# Patient Record
Sex: MALE | Race: OTHER | Employment: FULL TIME | URBAN - METROPOLITAN AREA
[De-identification: names, ages, dates, MRNs, and addresses within clinical notes are randomized per-mention and may not be internally consistent; named-entity substitution may affect disease eponyms.]

---

## 2018-09-06 ENCOUNTER — APPOINTMENT (OUTPATIENT)
Dept: GENERAL RADIOLOGY | Age: 48
End: 2018-09-06
Attending: EMERGENCY MEDICINE
Payer: COMMERCIAL

## 2018-09-06 ENCOUNTER — HOSPITAL ENCOUNTER (OUTPATIENT)
Age: 48
Discharge: HOME | End: 2018-09-06
Attending: EMERGENCY MEDICINE
Payer: COMMERCIAL

## 2018-09-06 VITALS
HEIGHT: 71 IN | DIASTOLIC BLOOD PRESSURE: 68 MMHG | TEMPERATURE: 97 F | OXYGEN SATURATION: 98 % | RESPIRATION RATE: 16 BRPM | WEIGHT: 208 LBS | SYSTOLIC BLOOD PRESSURE: 124 MMHG | HEART RATE: 58 BPM | BODY MASS INDEX: 29.12 KG/M2

## 2018-09-06 DIAGNOSIS — J30.2 SEASONAL ALLERGIES: Primary | ICD-10-CM

## 2018-09-06 DIAGNOSIS — R05.9 COUGH: ICD-10-CM

## 2018-09-06 PROCEDURE — 71046 X-RAY EXAM CHEST 2 VIEWS: CPT | Performed by: EMERGENCY MEDICINE

## 2018-09-06 PROCEDURE — 99204 OFFICE O/P NEW MOD 45 MIN: CPT

## 2018-09-06 PROCEDURE — 93010 ELECTROCARDIOGRAM REPORT: CPT

## 2018-09-06 PROCEDURE — 93005 ELECTROCARDIOGRAM TRACING: CPT

## 2018-09-06 RX ORDER — METHYLPREDNISOLONE 4 MG/1
TABLET ORAL
Qty: 1 PACKAGE | Refills: 0 | Status: SHIPPED | OUTPATIENT
Start: 2018-09-06 | End: 2019-02-27

## 2018-09-07 NOTE — ED PROVIDER NOTES
Patient presents with:  Cough/URI  Ear Problem    HPI:     Jose Cruz Paulson is a 50year old male who presents with chief complaint of cough, ear pressure, chest pain with cough. Started 2 days ago with congestion, thought to be due to allergy.   Took allegra None.  TECHNIQUE:  PA and lateral chest radiographs were obtained. PATIENT STATED HISTORY: (As transcribed by Technologist)  Patient states midsternal chest pain when sneezing from allergies for two days. States no cough or shortness of breath.     FINDING

## 2018-09-07 NOTE — ED INITIAL ASSESSMENT (HPI)
Started on Tuesday with allergy type symptoms of sneezing, runny nose. Then developed into cough, non productive, and hurts when coughing. Also feels like ears are clogged, no pain. Denies fevers.

## 2018-09-09 LAB
ATRIAL RATE: 51 BPM
P AXIS: 45 DEGREES
P-R INTERVAL: 168 MS
Q-T INTERVAL: 412 MS
QRS DURATION: 100 MS
QTC CALCULATION (BEZET): 379 MS
R AXIS: 55 DEGREES
T AXIS: 60 DEGREES
VENTRICULAR RATE: 51 BPM

## 2018-10-05 ENCOUNTER — CHARTING TRANS (OUTPATIENT)
Dept: OTHER | Age: 48
End: 2018-10-05

## 2018-10-11 ENCOUNTER — CHARTING TRANS (OUTPATIENT)
Dept: OTHER | Age: 48
End: 2018-10-11

## 2018-10-11 ASSESSMENT — PAIN SCALES - GENERAL: PAINLEVEL_OUTOF10: 0

## 2018-12-08 VITALS
OXYGEN SATURATION: 97 % | HEIGHT: 71 IN | TEMPERATURE: 98.6 F | RESPIRATION RATE: 16 BRPM | WEIGHT: 209.77 LBS | HEART RATE: 57 BPM | BODY MASS INDEX: 29.37 KG/M2

## 2019-02-27 ENCOUNTER — OFFICE VISIT (OUTPATIENT)
Dept: FAMILY MEDICINE CLINIC | Facility: CLINIC | Age: 49
End: 2019-02-27
Payer: COMMERCIAL

## 2019-02-27 VITALS
TEMPERATURE: 98 F | RESPIRATION RATE: 14 BRPM | SYSTOLIC BLOOD PRESSURE: 122 MMHG | DIASTOLIC BLOOD PRESSURE: 82 MMHG | HEIGHT: 70.5 IN | WEIGHT: 209.63 LBS | HEART RATE: 56 BPM | BODY MASS INDEX: 29.68 KG/M2

## 2019-02-27 DIAGNOSIS — Z00.00 PHYSICAL EXAM: Primary | ICD-10-CM

## 2019-02-27 DIAGNOSIS — Z12.11 ENCOUNTER FOR SCREENING COLONOSCOPY: ICD-10-CM

## 2019-02-27 DIAGNOSIS — Z13.220 LIPID SCREENING: ICD-10-CM

## 2019-02-27 PROCEDURE — 99386 PREV VISIT NEW AGE 40-64: CPT | Performed by: FAMILY MEDICINE

## 2019-02-27 NOTE — PROGRESS NOTES
HPI:    Patient ID: Javier Almodovar is a 46year old male. Patient presents with:  Physical: due for colonoscopy      HPI   Patient is here to establish care and for physical.  No significant past medical or surgical history.   States he had elevated blood canal normal.   Left Ear: Tympanic membrane, external ear and ear canal normal.   Nose: Nose normal.   Mouth/Throat: No oropharyngeal exudate, posterior oropharyngeal edema or posterior oropharyngeal erythema. Eyes: Right eye exhibits no discharge.  Left

## 2019-03-05 VITALS
RESPIRATION RATE: 16 BRPM | TEMPERATURE: 97.7 F | HEART RATE: 62 BPM | OXYGEN SATURATION: 98 % | DIASTOLIC BLOOD PRESSURE: 78 MMHG | SYSTOLIC BLOOD PRESSURE: 139 MMHG

## 2019-04-01 ENCOUNTER — PATIENT OUTREACH (OUTPATIENT)
Dept: FAMILY MEDICINE CLINIC | Facility: CLINIC | Age: 49
End: 2019-04-01

## 2019-05-10 PROCEDURE — 88305 TISSUE EXAM BY PATHOLOGIST: CPT | Performed by: INTERNAL MEDICINE

## 2019-11-12 ENCOUNTER — OFFICE VISIT (OUTPATIENT)
Dept: FAMILY MEDICINE CLINIC | Facility: CLINIC | Age: 49
End: 2019-11-12
Payer: COMMERCIAL

## 2019-11-12 VITALS
SYSTOLIC BLOOD PRESSURE: 118 MMHG | BODY MASS INDEX: 29.3 KG/M2 | TEMPERATURE: 98 F | HEIGHT: 70.5 IN | RESPIRATION RATE: 16 BRPM | DIASTOLIC BLOOD PRESSURE: 80 MMHG | WEIGHT: 207 LBS | HEART RATE: 60 BPM

## 2019-11-12 DIAGNOSIS — R51.9 NEW ONSET OF HEADACHES AFTER AGE 50: ICD-10-CM

## 2019-11-12 DIAGNOSIS — R73.9 HYPERGLYCEMIA: ICD-10-CM

## 2019-11-12 DIAGNOSIS — R51.9 NEW ONSET HEADACHE: Primary | ICD-10-CM

## 2019-11-12 DIAGNOSIS — Z00.00 PHYSICAL EXAM: ICD-10-CM

## 2019-11-12 PROCEDURE — 80061 LIPID PANEL: CPT | Performed by: FAMILY MEDICINE

## 2019-11-12 PROCEDURE — 83036 HEMOGLOBIN GLYCOSYLATED A1C: CPT | Performed by: FAMILY MEDICINE

## 2019-11-12 PROCEDURE — 36415 COLL VENOUS BLD VENIPUNCTURE: CPT | Performed by: FAMILY MEDICINE

## 2019-11-12 PROCEDURE — 80050 GENERAL HEALTH PANEL: CPT | Performed by: FAMILY MEDICINE

## 2019-11-12 PROCEDURE — 82607 VITAMIN B-12: CPT | Performed by: FAMILY MEDICINE

## 2019-11-12 PROCEDURE — 99214 OFFICE O/P EST MOD 30 MIN: CPT | Performed by: FAMILY MEDICINE

## 2019-11-12 NOTE — PROGRESS NOTES
La Morgan is a 46year old male. Patient presents with:  Headache: new problem, feels pressure in head,       HPI:   Had an interview on 10/21/19, started getting a bad headache when they were asking his questions.  Felt pressure in the temples and got lesions or rashes  RESPIRATORY: denies shortness of breath with exertion  CARDIOVASCULAR: denies chest pain on exertion  GI: denies abdominal pain and denies heartburn  NEURO: denies headaches    EXAM:   /80   Pulse 60   Temp 97.9 °F (36.6 °C) (Tempo Platelet          Standing Status: Future          Number of Occurrences: 1          Standing Expiration Date: 11/12/2020      Comp Metabolic Panel (14)          Standing Status: Future          Number of Occurrences: 1          Standing Expiration Date: 1

## 2019-11-13 ENCOUNTER — TELEPHONE (OUTPATIENT)
Dept: FAMILY MEDICINE CLINIC | Facility: CLINIC | Age: 49
End: 2019-11-13

## 2019-11-13 DIAGNOSIS — R73.9 HYPERGLYCEMIA: Primary | ICD-10-CM

## 2019-11-13 NOTE — TELEPHONE ENCOUNTER
----- Message from Ameya Loomis DO sent at 11/13/2019  2:42 PM CST -----  Pls let pt know: [facility], your labs are back. Blood cell counts, thyroid and vit b12 are normal, but you have a low sodium and a high sugar level and your cholesterol is high.  That a

## 2019-11-15 NOTE — TELEPHONE ENCOUNTER
Per [facility], MRI has been authorized.  Valid until 12/14/19    Patient notified via detailed voicemail left at cell number (ok per  HIPAA consent)

## 2019-11-25 ENCOUNTER — OFFICE VISIT (OUTPATIENT)
Dept: FAMILY MEDICINE CLINIC | Facility: CLINIC | Age: 49
End: 2019-11-25
Payer: COMMERCIAL

## 2019-11-25 VITALS
SYSTOLIC BLOOD PRESSURE: 100 MMHG | HEIGHT: 70.5 IN | TEMPERATURE: 98 F | HEART RATE: 76 BPM | BODY MASS INDEX: 28.74 KG/M2 | WEIGHT: 203 LBS | DIASTOLIC BLOOD PRESSURE: 66 MMHG | RESPIRATION RATE: 16 BRPM

## 2019-11-25 DIAGNOSIS — E78.00 PURE HYPERCHOLESTEROLEMIA: ICD-10-CM

## 2019-11-25 DIAGNOSIS — E11.65 TYPE 2 DIABETES MELLITUS WITH HYPERGLYCEMIA, WITHOUT LONG-TERM CURRENT USE OF INSULIN (HCC): Primary | ICD-10-CM

## 2019-11-25 DIAGNOSIS — Z23 NEED FOR VACCINATION: ICD-10-CM

## 2019-11-25 PROCEDURE — 82570 ASSAY OF URINE CREATININE: CPT | Performed by: FAMILY MEDICINE

## 2019-11-25 PROCEDURE — 82043 UR ALBUMIN QUANTITATIVE: CPT | Performed by: FAMILY MEDICINE

## 2019-11-25 PROCEDURE — 90471 IMMUNIZATION ADMIN: CPT | Performed by: FAMILY MEDICINE

## 2019-11-25 PROCEDURE — 90686 IIV4 VACC NO PRSV 0.5 ML IM: CPT | Performed by: FAMILY MEDICINE

## 2019-11-25 PROCEDURE — 99214 OFFICE O/P EST MOD 30 MIN: CPT | Performed by: FAMILY MEDICINE

## 2019-11-25 RX ORDER — METFORMIN HYDROCHLORIDE 500 MG/1
500 TABLET, EXTENDED RELEASE ORAL 2 TIMES DAILY WITH MEALS
Qty: 180 TABLET | Refills: 0 | Status: SHIPPED | OUTPATIENT
Start: 2019-11-25 | End: 2020-02-22

## 2019-11-25 RX ORDER — ATORVASTATIN CALCIUM 10 MG/1
10 TABLET, FILM COATED ORAL NIGHTLY
Qty: 90 TABLET | Refills: 0 | Status: SHIPPED | OUTPATIENT
Start: 2019-11-25 | End: 2020-02-10

## 2019-11-25 NOTE — PROGRESS NOTES
Magdalena Ley is a 46year old male. Patient presents with:  Diabetes: discuss new diabetes      HPI:   New diabetic. Found on labs after onset of new headaches. Headaches have since resolved. Not on meds.  He does the cooking at home and makes carb-heav Temp 97.9 °F (36.6 °C) (Temporal)   Resp 16   Ht 70.5\"   Wt 203 lb (92.1 kg)   BMI 28.72 kg/m²  Body mass index is 28.72 kg/m².       GENERAL: well developed, well nourished,in no apparent distress  SKIN: no rashes,no suspicious lesions  HEENT: atraumatic, this Visit:  Requested Prescriptions     Signed Prescriptions Disp Refills   • metFORMIN HCl  MG Oral Tablet 24 Hr 180 tablet 0     Sig: Take 1 tablet (500 mg total) by mouth 2 (two) times daily with meals.    • atorvastatin 10 MG Oral Tab 90 tablet 0

## 2019-12-02 ENCOUNTER — NURSE ONLY (OUTPATIENT)
Dept: ENDOCRINOLOGY CLINIC | Facility: CLINIC | Age: 49
End: 2019-12-02
Payer: COMMERCIAL

## 2019-12-02 DIAGNOSIS — E11.65 TYPE 2 DIABETES MELLITUS WITH HYPERGLYCEMIA, WITHOUT LONG-TERM CURRENT USE OF INSULIN (HCC): Primary | ICD-10-CM

## 2019-12-02 PROCEDURE — G0108 DIAB MANAGE TRN  PER INDIV: HCPCS | Performed by: DIETITIAN, REGISTERED

## 2019-12-02 RX ORDER — BLOOD SUGAR DIAGNOSTIC
STRIP MISCELLANEOUS
Qty: 200 EACH | Refills: 3 | Status: SHIPPED | OUTPATIENT
Start: 2019-12-02 | End: 2019-12-18

## 2019-12-08 VITALS — DIASTOLIC BLOOD PRESSURE: 66 MMHG | BODY MASS INDEX: 30 KG/M2 | WEIGHT: 210 LBS | SYSTOLIC BLOOD PRESSURE: 112 MMHG

## 2019-12-08 NOTE — PROGRESS NOTES
Taco Barahona  : 1970 attended Step 1 Diabetic Education:    Date: 2019  Referring Provider: Dr. Rocio Verma  Start time: 2pm End time: 3:00pm    /72 (BP Location: Right arm, Patient Position: Sitting, Cuff Size: adult)   Wt 210 lb (95.3 Start monitoring blood glucose twice daily at varied times  Limit carbs to 45-60 grams per meal; measure rice & beans & reduce no.  of tortillas  Start drinking more water reduce reg. coke intake   Follow-up in 2 wks      Prescriptions sent to [facility] p

## 2019-12-10 ENCOUNTER — NURSE ONLY (OUTPATIENT)
Dept: ENDOCRINOLOGY CLINIC | Facility: CLINIC | Age: 49
End: 2019-12-10
Payer: COMMERCIAL

## 2019-12-10 DIAGNOSIS — E11.9 DIABETES MELLITUS WITHOUT COMPLICATION (HCC): Primary | ICD-10-CM

## 2019-12-10 PROCEDURE — G0108 DIAB MANAGE TRN  PER INDIV: HCPCS | Performed by: DIETITIAN, REGISTERED

## 2019-12-15 VITALS — BODY MASS INDEX: 30 KG/M2 | SYSTOLIC BLOOD PRESSURE: 110 MMHG | DIASTOLIC BLOOD PRESSURE: 70 MMHG | WEIGHT: 209.63 LBS

## 2019-12-18 ENCOUNTER — TELEPHONE (OUTPATIENT)
Dept: ENDOCRINOLOGY CLINIC | Facility: CLINIC | Age: 49
End: 2019-12-18

## 2019-12-18 DIAGNOSIS — E11.65 TYPE 2 DIABETES MELLITUS WITH HYPERGLYCEMIA, WITHOUT LONG-TERM CURRENT USE OF INSULIN (HCC): ICD-10-CM

## 2019-12-18 RX ORDER — BLOOD SUGAR DIAGNOSTIC
1 STRIP MISCELLANEOUS 2 TIMES DAILY
Qty: 200 EACH | Refills: 3 | Status: SHIPPED | OUTPATIENT
Start: 2019-12-18 | End: 2019-12-19

## 2020-01-21 ENCOUNTER — NURSE ONLY (OUTPATIENT)
Dept: ENDOCRINOLOGY CLINIC | Facility: CLINIC | Age: 50
End: 2020-01-21
Payer: COMMERCIAL

## 2020-01-21 VITALS — BODY MASS INDEX: 30 KG/M2 | WEIGHT: 209.38 LBS | DIASTOLIC BLOOD PRESSURE: 66 MMHG | SYSTOLIC BLOOD PRESSURE: 98 MMHG

## 2020-01-21 DIAGNOSIS — E11.65 TYPE 2 DIABETES MELLITUS WITH HYPERGLYCEMIA, WITHOUT LONG-TERM CURRENT USE OF INSULIN (HCC): ICD-10-CM

## 2020-01-21 PROCEDURE — G0108 DIAB MANAGE TRN  PER INDIV: HCPCS | Performed by: DIETITIAN, REGISTERED

## 2020-01-21 NOTE — PROGRESS NOTES
Audrene Cockayne  : 1970 was seen for Diabetic Education Follow up:    Date: 2020  Referring Provider: Dr. Epi Archibald  Start time: 8:30am End time: 9:00am    Assessment:     Assessment: BP 98/66 (BP Location: Right arm, Patient Position: Sitting, 1 week    Hypoglycemia  - Causes/symptoms/prevention/treatment-Rule of 15  - Low Blood Glucose: <70 mg/dL  - Contact MD if >2BG <70 in 1 week    REDUCING RISKS:  - relationship between glucose control and risk for complications in eye, heart, kidneys,nerve

## 2020-02-10 DIAGNOSIS — E78.00 PURE HYPERCHOLESTEROLEMIA: ICD-10-CM

## 2020-02-10 RX ORDER — ATORVASTATIN CALCIUM 10 MG/1
10 TABLET, FILM COATED ORAL DAILY
Qty: 90 TABLET | Refills: 0 | Status: SHIPPED | OUTPATIENT
Start: 2020-02-10 | End: 2020-05-11

## 2020-02-10 NOTE — TELEPHONE ENCOUNTER
Last OV 11/25/19: Return in about 3 months (around 2/25/2020).      Last lipid/cmp 11/12/19, ALT 77  Last refilled 11/25/19  #90  0 refills    Future Appointments   Date Time Provider Laurel Buitrago   3/17/2020  8:30 AM Elise Appiah RN, CDE [facility]

## 2020-02-21 ENCOUNTER — OFFICE VISIT (OUTPATIENT)
Dept: FAMILY MEDICINE CLINIC | Facility: CLINIC | Age: 50
End: 2020-02-21
Payer: COMMERCIAL

## 2020-02-21 VITALS
SYSTOLIC BLOOD PRESSURE: 118 MMHG | RESPIRATION RATE: 16 BRPM | HEIGHT: 70.5 IN | TEMPERATURE: 97 F | HEART RATE: 68 BPM | WEIGHT: 211 LBS | BODY MASS INDEX: 29.87 KG/M2 | DIASTOLIC BLOOD PRESSURE: 70 MMHG

## 2020-02-21 DIAGNOSIS — E11.65 TYPE 2 DIABETES MELLITUS WITH HYPERGLYCEMIA, WITHOUT LONG-TERM CURRENT USE OF INSULIN (HCC): Primary | ICD-10-CM

## 2020-02-21 DIAGNOSIS — E78.00 PURE HYPERCHOLESTEROLEMIA: ICD-10-CM

## 2020-02-21 LAB
CHOLEST SMN-MCNC: 152 MG/DL (ref ?–200)
EST. AVERAGE GLUCOSE BLD GHB EST-MCNC: 197 MG/DL (ref 68–126)
HBA1C MFR BLD HPLC: 8.5 % (ref ?–5.7)
HDLC SERPL-MCNC: 53 MG/DL (ref 40–59)
LDLC SERPL CALC-MCNC: 50 MG/DL (ref ?–100)
NONHDLC SERPL-MCNC: 99 MG/DL (ref ?–130)
PATIENT FASTING Y/N/NP: NO
TRIGL SERPL-MCNC: 247 MG/DL (ref 30–149)
VLDLC SERPL CALC-MCNC: 49 MG/DL (ref 0–30)

## 2020-02-21 PROCEDURE — 83036 HEMOGLOBIN GLYCOSYLATED A1C: CPT | Performed by: FAMILY MEDICINE

## 2020-02-21 PROCEDURE — 80061 LIPID PANEL: CPT | Performed by: FAMILY MEDICINE

## 2020-02-21 PROCEDURE — 99214 OFFICE O/P EST MOD 30 MIN: CPT | Performed by: FAMILY MEDICINE

## 2020-02-21 PROCEDURE — 36415 COLL VENOUS BLD VENIPUNCTURE: CPT | Performed by: FAMILY MEDICINE

## 2020-02-21 NOTE — PROGRESS NOTES
HPI:   Princess Choi is a 44year old male who presents for recheck of his diabetes. Patient’s FBS have been 140-150, average in 177 for 3 months. Last visit with ophthalmologist was earlier this year at [facility].   Pt has been checking his feet on a regular daily with meals.  180 tablet 0      Past Medical History:   Diagnosis Date   • Allergies       Past Surgical History:   Procedure Laterality Date   • COLONOSCOPY  05/2019    normal- repeat 10 yrs      Social History: Social History    Tobacco Use      Smok return in 3 months or sooner if needed.      Type 2 diabetes mellitus with hyperglycemia, without long-term current use of insulin (hcc)  (primary encounter diagnosis)  Pure hypercholesterolemia    Orders Placed This Encounter      Lipid Panel      Hemoglob

## 2020-02-22 RX ORDER — METFORMIN HYDROCHLORIDE 500 MG/1
1000 TABLET, EXTENDED RELEASE ORAL 2 TIMES DAILY WITH MEALS
Qty: 360 TABLET | Refills: 0 | Status: SHIPPED | OUTPATIENT
Start: 2020-02-22 | End: 2020-05-29

## 2020-02-25 DIAGNOSIS — E11.65 TYPE 2 DIABETES MELLITUS WITH HYPERGLYCEMIA, WITHOUT LONG-TERM CURRENT USE OF INSULIN (HCC): Primary | ICD-10-CM

## 2020-05-10 DIAGNOSIS — E78.00 PURE HYPERCHOLESTEROLEMIA: ICD-10-CM

## 2020-05-11 RX ORDER — ATORVASTATIN CALCIUM 10 MG/1
TABLET, FILM COATED ORAL
Qty: 90 TABLET | Refills: 1 | Status: SHIPPED | OUTPATIENT
Start: 2020-05-11 | End: 2020-11-09

## 2020-05-11 NOTE — TELEPHONE ENCOUNTER
Cholesterol Medication Protocol Passed5/10 9:07 PM   ALT < 80    ALT resulted within past year    Lipid panel within past 12 months    Appointment within past 12 or next 3 months     Last refill - 2/10/20 - #90   Last ALT - 11/12/19 - 77  Last lipid panel

## 2020-05-28 ENCOUNTER — E-VISIT (OUTPATIENT)
Dept: FAMILY MEDICINE CLINIC | Facility: CLINIC | Age: 50
End: 2020-05-28

## 2020-05-28 DIAGNOSIS — Z20.822 SUSPECTED COVID-19 VIRUS INFECTION: Primary | ICD-10-CM

## 2020-05-28 PROCEDURE — 99421 OL DIG E/M SVC 5-10 MIN: CPT | Performed by: NURSE PRACTITIONER

## 2020-05-28 NOTE — PROGRESS NOTES
La Morgan is a 44year old male. HPI:   See answers to questions above.      Current Outpatient Medications   Medication Sig Dispense Refill   • ATORVASTATIN 10 MG Oral Tab TAKE 1 TABLET BY MOUTH DAILY 90 tablet 1   • CONTOUR NEXT TEST In Vitro Strip 1

## 2020-05-29 DIAGNOSIS — E11.65 TYPE 2 DIABETES MELLITUS WITH HYPERGLYCEMIA, WITHOUT LONG-TERM CURRENT USE OF INSULIN (HCC): ICD-10-CM

## 2020-05-29 RX ORDER — METFORMIN HYDROCHLORIDE 500 MG/1
TABLET, EXTENDED RELEASE ORAL
Qty: 360 TABLET | Refills: 0 | Status: SHIPPED | OUTPATIENT
Start: 2020-05-29 | End: 2020-09-08

## 2020-05-29 NOTE — TELEPHONE ENCOUNTER
Future Appointments   Date Time Provider Laurel Buitrago   6/1/2020  4:00 PM Divya Paulino DO [facility] MONIQUE Sandoval

## 2020-05-29 NOTE — TELEPHONE ENCOUNTER
Last OV 2/21/2020  Last lab 2/21/2020 A1c 8.5,  11/25/19 microalbumin  Last refilled 2/22/2020  #360  0 refills      Diabetic Medication Protocol Failed5/29 1:50 PM   Last HgBA1C < 7.5    HgBA1C procedure resulted in past 6 months    Microalbumin procedure

## 2020-05-29 NOTE — TELEPHONE ENCOUNTER
Patient notified via detailed voicemail left at cell number (ok per  HIPAA consent)    Asked to call back and schedule.  Can refill until appt time

## 2020-06-01 ENCOUNTER — LAB ENCOUNTER (OUTPATIENT)
Dept: LAB | Facility: HOSPITAL | Age: 50
End: 2020-06-01
Attending: NURSE PRACTITIONER
Payer: COMMERCIAL

## 2020-06-01 DIAGNOSIS — Z20.822 SUSPECTED COVID-19 VIRUS INFECTION: ICD-10-CM

## 2020-06-02 ENCOUNTER — TELEPHONE (OUTPATIENT)
Dept: INTERNAL MEDICINE CLINIC | Facility: CLINIC | Age: 50
End: 2020-06-02

## 2020-06-02 ENCOUNTER — PATIENT OUTREACH (OUTPATIENT)
Dept: CASE MANAGEMENT | Age: 50
End: 2020-06-02

## 2020-06-02 NOTE — PROGRESS NOTES
Home Monitoring Condition Update    Covid19+ test date: 6/1/2020      Consent Verification:  Assessment Completed With: Patient  HIPAA Verified?   Yes    COVID-19 HOME MONITORING 6/2/2020   How are you feeling \"feeling good\"   Short of breath No   Cough

## 2020-06-04 ENCOUNTER — VIRTUAL PHONE E/M (OUTPATIENT)
Dept: FAMILY MEDICINE CLINIC | Facility: CLINIC | Age: 50
End: 2020-06-04
Payer: COMMERCIAL

## 2020-06-04 DIAGNOSIS — U07.1 COVID-19 VIRUS INFECTION: Primary | ICD-10-CM

## 2020-06-04 DIAGNOSIS — E11.65 TYPE 2 DIABETES MELLITUS WITH HYPERGLYCEMIA, WITHOUT LONG-TERM CURRENT USE OF INSULIN (HCC): ICD-10-CM

## 2020-06-04 PROCEDURE — 99214 OFFICE O/P EST MOD 30 MIN: CPT | Performed by: FAMILY MEDICINE

## 2020-06-04 NOTE — PROGRESS NOTES
Abraham Muro,   P [facility] Home Monitoring             He is short of breath with exertion, not starting to get better yet and uncontrolled diabetic. Please follow up in 1-2 days for monitoring. Thank you!

## 2020-06-04 NOTE — PROGRESS NOTES
Virtual Telephone Check-In    Bulmaro Soto verbally consents to a Virtual/Telephone Check-In visit on 06/04/20. Patient has been referred to the [facility] website at [email or web address] to review the yearly Consent to Treat document.     Patient Hetal Vásquez this visit:    COVID-19 virus infection    Type 2 diabetes mellitus with hyperglycemia, without long-term current use of insulin ([facility]      Doing okay, not getting worse at this point.    He is diabetic, so he does need to be monitored closely, he does have

## 2020-06-05 ENCOUNTER — PATIENT OUTREACH (OUTPATIENT)
Dept: CASE MANAGEMENT | Age: 50
End: 2020-06-05

## 2020-06-05 ENCOUNTER — TELEPHONE (OUTPATIENT)
Dept: FAMILY MEDICINE CLINIC | Facility: CLINIC | Age: 50
End: 2020-06-05

## 2020-06-05 NOTE — PROGRESS NOTES
LMOMTCB for home monitoring day #3 condition update. NCM contact information provided.       See PCP notes from 6/4/2020 VV      Diagnoses and all orders for this visit:     COVID-19 virus infection     Type 2 diabetes mellitus with hyperglycemia, without

## 2020-06-05 NOTE — TELEPHONE ENCOUNTER
Spoke to patient for day 3 home monitoring update. Patient denies SOB today, still has intermittent cough which he reports is the same. Patient mentioned new rash that developed yesterday on R inner side of ankle and R inner side of forearm.  Reports the r

## 2020-06-05 NOTE — PROGRESS NOTES
Home Monitoring Condition Update    Covid19+ test date: 6/1/20      Consent Verification:  Assessment Completed With: Patient  HIPAA Verified?   Yes    COVID-19 HOME MONITORING 6/5/2020   Temperature 97.6   Reading From Forehead   Pulse 84   Pulse taken f patient was also directed to continue to isolate away from other household members when possible and stay completely isolated from the general public 3 days after symptoms resolve or 10 days total (whichever is longer).   Advised patient If symptoms worsen/

## 2020-06-06 ENCOUNTER — PATIENT OUTREACH (OUTPATIENT)
Dept: CASE MANAGEMENT | Age: 50
End: 2020-06-06

## 2020-06-08 ENCOUNTER — TELEPHONE (OUTPATIENT)
Dept: FAMILY MEDICINE CLINIC | Facility: CLINIC | Age: 50
End: 2020-06-08

## 2020-06-08 ENCOUNTER — PATIENT OUTREACH (OUTPATIENT)
Dept: CASE MANAGEMENT | Age: 50
End: 2020-06-08

## 2020-06-08 NOTE — TELEPHONE ENCOUNTER
Have him follow up with me for phone visit Thursday this week and I will let you know if he needs further monitoring. Thanks.

## 2020-06-08 NOTE — TELEPHONE ENCOUNTER
Call to Dr Kulwant Del Angel ofc-spoke w Reyesyasmindeanna Vance re condition update sent to  this morning. Seeking Dr murrell. Cherelle Henning Dr is w patients. She will route messg to clinical staff again now to obtain input from Dr Toma Wright.  Provided our ofc # and phone

## 2020-06-08 NOTE — TELEPHONE ENCOUNTER
Spoke with patient for day #4 Covid Home Monitoring (test date 6/01/2020)--symptoms, overall, are improving, but rash is slightly worse, despite following recommendations.     Temp: 97.8 (temporal)  Pulse: 64 (manual)    \"I'm feeling better now-my cough is

## 2020-06-08 NOTE — PROGRESS NOTES
Home Monitoring Condition Update    Covid19+ test date: 6/01/2002      Consent Verification:  Assessment Completed With: Patient  HIPAA Verified?   Yes    COVID-19 HOME MONITORING 6/8/2020   Temperature 97.8   Reading From Forehead   Pulse 64   Pulse take diet with small, frequent meals. Advised to take Tylenol q 6 hrs prn fever/pain, not to exceed 3000 mg/day--to call back with worsening symptoms--patient verbalizes understanding and agreement.     Patient advised to inform their Employee Health department

## 2020-06-08 NOTE — PROGRESS NOTES
LMOMTCB for home monitoring day #4 condition update. NCM contact information provided.      F/U rash to right ankle and right forearm (OTC antihistamines?)                 Diagnoses and all orders for this visit:     COVID-19 virus infection     Type 2 tye

## 2020-06-08 NOTE — TELEPHONE ENCOUNTER
Call to pt-advised of info noted below stating PCP will get back to him w recommendations re rash.    Also advised PCP recommends VV 6/11/20-telephone visit scheduled for 6/11/2020 900 am.   Patient voices understanding/agrees with plan/no further questions

## 2020-06-09 ENCOUNTER — PATIENT OUTREACH (OUTPATIENT)
Dept: CASE MANAGEMENT | Age: 50
End: 2020-06-09

## 2020-06-09 NOTE — PROGRESS NOTES
Home Monitoring Condition Update    Covid19+ test date: 6/01/2020      Consent Verification:  Assessment Completed With: Patient  HIPAA Verified?   Yes    COVID-19 HOME MONITORING 6/9/2020   Temperature 97.4   Reading From Forehead   Pulse 60   Pulse take Advised to take Tylenol q 6 hrs prn fever/pain, not to exceed 3000 mg/day--to call back with worsening symptoms--patient verbalizes understanding and agreement.       Patient advised to inform their Employee Health department or Manager when they have teste

## 2020-06-10 ENCOUNTER — PATIENT OUTREACH (OUTPATIENT)
Dept: CASE MANAGEMENT | Age: 50
End: 2020-06-10

## 2020-06-10 NOTE — PROGRESS NOTES
Home Monitoring Condition Update    Covid19+ test date: 6/01/2020      Consent Verification:  Assessment Completed With: Patient  HIPAA Verified?   Yes    COVID-19 HOME MONITORING 6/10/2020   Temperature 97.8   Reading From Forehead   Pulse 56   Pulse tanya back with worsening symptoms--patient verbalizes understanding and agreement. Patient advised to inform their Employee Health department or Manager when they have tested positive for COVID-19.       The patient was also directed to continue to isolate aw

## 2020-06-11 ENCOUNTER — VIRTUAL PHONE E/M (OUTPATIENT)
Dept: FAMILY MEDICINE CLINIC | Facility: CLINIC | Age: 50
End: 2020-06-11
Payer: COMMERCIAL

## 2020-06-11 ENCOUNTER — PATIENT OUTREACH (OUTPATIENT)
Dept: CASE MANAGEMENT | Age: 50
End: 2020-06-11

## 2020-06-11 DIAGNOSIS — L30.9 DERMATITIS: ICD-10-CM

## 2020-06-11 DIAGNOSIS — U07.1 COVID-19 VIRUS INFECTION: Primary | ICD-10-CM

## 2020-06-11 DIAGNOSIS — E11.65 TYPE 2 DIABETES MELLITUS WITH HYPERGLYCEMIA, WITHOUT LONG-TERM CURRENT USE OF INSULIN (HCC): ICD-10-CM

## 2020-06-11 PROCEDURE — 99214 OFFICE O/P EST MOD 30 MIN: CPT | Performed by: FAMILY MEDICINE

## 2020-06-11 NOTE — PROGRESS NOTES
Virtual Telephone Check-In    Sourav Glsas verbally consents to a Virtual/Telephone Check-In visit on 06/11/20. Patient has been referred to the [facility] website at [email or web address] to review the yearly Consent to Treat document.     Patient Lelia Backbone MOUTH TWICE DAILY WITH MEALS, Disp: 360 tablet, Rfl: 0  ATORVASTATIN 10 MG Oral Tab, TAKE 1 TABLET BY MOUTH DAILY, Disp: 90 tablet, Rfl: 1  CONTOUR NEXT TEST In Vitro Strip, 1 each by Other route 4 (four) times daily before meals and nightly.  Test 4-6 time

## 2020-06-11 NOTE — PROGRESS NOTES
Madelin Osorio,   P [facility] Home Monitoring             Can discharge from home monitoring program. Thanks!

## 2020-09-07 DIAGNOSIS — E11.65 TYPE 2 DIABETES MELLITUS WITH HYPERGLYCEMIA, WITHOUT LONG-TERM CURRENT USE OF INSULIN (HCC): ICD-10-CM

## 2020-09-08 ENCOUNTER — LABORATORY ENCOUNTER (OUTPATIENT)
Dept: LAB | Age: 50
End: 2020-09-08
Attending: FAMILY MEDICINE
Payer: COMMERCIAL

## 2020-09-08 ENCOUNTER — OFFICE VISIT (OUTPATIENT)
Dept: FAMILY MEDICINE CLINIC | Facility: CLINIC | Age: 50
End: 2020-09-08
Payer: COMMERCIAL

## 2020-09-08 VITALS
SYSTOLIC BLOOD PRESSURE: 120 MMHG | WEIGHT: 209 LBS | BODY MASS INDEX: 29.59 KG/M2 | RESPIRATION RATE: 16 BRPM | HEART RATE: 64 BPM | HEIGHT: 70.5 IN | DIASTOLIC BLOOD PRESSURE: 80 MMHG | TEMPERATURE: 98 F

## 2020-09-08 DIAGNOSIS — R74.8 ELEVATED LIVER ENZYMES: ICD-10-CM

## 2020-09-08 DIAGNOSIS — E11.65 TYPE 2 DIABETES MELLITUS WITH HYPERGLYCEMIA, WITHOUT LONG-TERM CURRENT USE OF INSULIN (HCC): ICD-10-CM

## 2020-09-08 DIAGNOSIS — Z86.16 HISTORY OF 2019 NOVEL CORONAVIRUS DISEASE (COVID-19): ICD-10-CM

## 2020-09-08 DIAGNOSIS — E11.65 TYPE 2 DIABETES MELLITUS WITH HYPERGLYCEMIA, WITHOUT LONG-TERM CURRENT USE OF INSULIN (HCC): Primary | ICD-10-CM

## 2020-09-08 DIAGNOSIS — U07.1 COVID-19 VIRUS INFECTION: ICD-10-CM

## 2020-09-08 LAB
ALBUMIN SERPL-MCNC: 4.4 G/DL (ref 3.4–5)
ALBUMIN/GLOB SERPL: 1.1 {RATIO} (ref 1–2)
ALP LIVER SERPL-CCNC: 103 U/L (ref 45–117)
ALT SERPL-CCNC: 121 U/L (ref 16–61)
ANION GAP SERPL CALC-SCNC: 5 MMOL/L (ref 0–18)
AST SERPL-CCNC: 49 U/L (ref 15–37)
BILIRUB SERPL-MCNC: 0.8 MG/DL (ref 0.1–2)
BUN BLD-MCNC: 14 MG/DL (ref 7–18)
BUN/CREAT SERPL: 15.2 (ref 10–20)
CALCIUM BLD-MCNC: 9.9 MG/DL (ref 8.5–10.1)
CHLORIDE SERPL-SCNC: 103 MMOL/L (ref 98–112)
CHOLEST SMN-MCNC: 202 MG/DL (ref ?–200)
CO2 SERPL-SCNC: 28 MMOL/L (ref 21–32)
CREAT BLD-MCNC: 0.92 MG/DL (ref 0.7–1.3)
EST. AVERAGE GLUCOSE BLD GHB EST-MCNC: 186 MG/DL (ref 68–126)
GLOBULIN PLAS-MCNC: 3.9 G/DL (ref 2.8–4.4)
GLUCOSE BLD-MCNC: 155 MG/DL (ref 70–99)
HBA1C MFR BLD HPLC: 8.1 % (ref ?–5.7)
HDLC SERPL-MCNC: 56 MG/DL (ref 40–59)
LDLC SERPL CALC-MCNC: 118 MG/DL (ref ?–100)
M PROTEIN MFR SERPL ELPH: 8.3 G/DL (ref 6.4–8.2)
NONHDLC SERPL-MCNC: 146 MG/DL (ref ?–130)
OSMOLALITY SERPL CALC.SUM OF ELEC: 286 MOSM/KG (ref 275–295)
PATIENT FASTING Y/N/NP: NO
PATIENT FASTING Y/N/NP: NO
POTASSIUM SERPL-SCNC: 4.5 MMOL/L (ref 3.5–5.1)
SARS-COV-2 IGG SERPLBLD QL IA.RAPID: POSITIVE
SODIUM SERPL-SCNC: 136 MMOL/L (ref 136–145)
TRIGL SERPL-MCNC: 140 MG/DL (ref 30–149)
VLDLC SERPL CALC-MCNC: 28 MG/DL (ref 0–30)

## 2020-09-08 PROCEDURE — 80061 LIPID PANEL: CPT | Performed by: FAMILY MEDICINE

## 2020-09-08 PROCEDURE — 3008F BODY MASS INDEX DOCD: CPT | Performed by: FAMILY MEDICINE

## 2020-09-08 PROCEDURE — 83540 ASSAY OF IRON: CPT | Performed by: FAMILY MEDICINE

## 2020-09-08 PROCEDURE — 80053 COMPREHEN METABOLIC PANEL: CPT | Performed by: FAMILY MEDICINE

## 2020-09-08 PROCEDURE — 3074F SYST BP LT 130 MM HG: CPT | Performed by: FAMILY MEDICINE

## 2020-09-08 PROCEDURE — 36415 COLL VENOUS BLD VENIPUNCTURE: CPT | Performed by: FAMILY MEDICINE

## 2020-09-08 PROCEDURE — 83036 HEMOGLOBIN GLYCOSYLATED A1C: CPT | Performed by: FAMILY MEDICINE

## 2020-09-08 PROCEDURE — 3079F DIAST BP 80-89 MM HG: CPT | Performed by: FAMILY MEDICINE

## 2020-09-08 PROCEDURE — 86769 SARS-COV-2 COVID-19 ANTIBODY: CPT | Performed by: FAMILY MEDICINE

## 2020-09-08 PROCEDURE — 99214 OFFICE O/P EST MOD 30 MIN: CPT | Performed by: FAMILY MEDICINE

## 2020-09-08 PROCEDURE — 83550 IRON BINDING TEST: CPT | Performed by: FAMILY MEDICINE

## 2020-09-08 RX ORDER — METFORMIN HYDROCHLORIDE 500 MG/1
TABLET, EXTENDED RELEASE ORAL
Qty: 360 TABLET | Refills: 0 | Status: SHIPPED | OUTPATIENT
Start: 2020-09-08 | End: 2020-12-15

## 2020-09-08 NOTE — TELEPHONE ENCOUNTER
Diabetic Medication Protocol Failed9/7 8:49 AM   HgBA1C procedure resulted in past 6 months    Last HgBA1C < 7.5    Appointment in past 6 or next 3 months    Microalbumin procedure in past 12 months or taking ACE/ARB     Routing to provider per protocol.

## 2020-09-08 NOTE — PROGRESS NOTES
HPI:   Fahad Wray is a 44year old male who presents for recheck of his diabetes. Patient’s FBS have been 150. Last visit with ophthalmologist was within the past year. Pt has been checking his feet on a regular basis.  Pt denies any tingling of the fee • JOHNNY MICROLET LANCETS Does not apply Misc Test twice daily 2 Box 3      Past Medical History:   Diagnosis Date   • Allergies       Past Surgical History:   Procedure Laterality Date   • COLONOSCOPY  05/2019    normal- repeat 10 yrs      Social History asked to return in 3 months for DM follow up.      Type 2 diabetes mellitus with hyperglycemia, without long-term current use of insulin (hcc)  (primary encounter diagnosis)  Covid-19 virus infection  History of 2019 novel coronavirus disease (covid-19)  -

## 2020-09-09 ENCOUNTER — TELEPHONE (OUTPATIENT)
Dept: FAMILY MEDICINE CLINIC | Facility: CLINIC | Age: 50
End: 2020-09-09

## 2020-09-09 DIAGNOSIS — R74.8 ELEVATED LIVER ENZYMES: Primary | ICD-10-CM

## 2020-09-09 DIAGNOSIS — E11.65 TYPE 2 DIABETES MELLITUS WITH HYPERGLYCEMIA, WITHOUT LONG-TERM CURRENT USE OF INSULIN (HCC): ICD-10-CM

## 2020-09-09 DIAGNOSIS — U07.1 COVID-19 VIRUS INFECTION: ICD-10-CM

## 2020-09-09 LAB
IRON SATURATION: 17 % (ref 20–50)
IRON SERPL-MCNC: 78 UG/DL (ref 65–175)
TOTAL IRON BINDING CAPACITY: 454 UG/DL (ref 240–450)
TRANSFERRIN SERPL-MCNC: 305 MG/DL (ref 200–360)

## 2020-09-09 RX ORDER — GLIPIZIDE 5 MG/1
5 TABLET, FILM COATED, EXTENDED RELEASE ORAL DAILY
Qty: 90 TABLET | Refills: 0 | Status: SHIPPED | OUTPATIENT
Start: 2020-09-09 | End: 2020-12-15

## 2020-09-10 NOTE — TELEPHONE ENCOUNTER
See result note. DM: glipizide sent. Recall A1c in 3 months. Liver enzymes: added iron/tibc. Can we call him back for hepatitis labs, please. Order placed. Recall LFT's in 3 months as well. Future orders placed.

## 2020-09-10 NOTE — TELEPHONE ENCOUNTER
Attempted to contact patient at cell number (only number on HIPAA form) but was unable to leave message. Voicemail is full    In addition to scheduling for hepatitis lab draw, please make sure patient has received iron/tibc result.   Written by Laurita Hamlin

## 2020-09-15 NOTE — TELEPHONE ENCOUNTER
Patient has reviewed iron lab results:  Viewed by Sheba Carrizales on 9/14/2020  9:25 PM       Attempted to contact patient but was unable to leave message regarding appointment for labs to check for hepatitis.  Mailbox full    Converginhart sent

## 2020-11-08 DIAGNOSIS — E78.00 PURE HYPERCHOLESTEROLEMIA: ICD-10-CM

## 2020-11-09 RX ORDER — ATORVASTATIN CALCIUM 10 MG/1
TABLET, FILM COATED ORAL
Qty: 90 TABLET | Refills: 2 | Status: SHIPPED | OUTPATIENT
Start: 2020-11-09 | End: 2021-07-06

## 2020-11-09 NOTE — TELEPHONE ENCOUNTER
Cholesterol Medication Protocol Askphk4811/08/2020 04:07 PM   ALT < 80 Protocol Details    ALT resulted within past year     Lipid panel within past 12 months     Appointment within past 12 or next 3 months

## 2020-12-13 DIAGNOSIS — E11.65 TYPE 2 DIABETES MELLITUS WITH HYPERGLYCEMIA, WITHOUT LONG-TERM CURRENT USE OF INSULIN (HCC): ICD-10-CM

## 2020-12-14 NOTE — TELEPHONE ENCOUNTER
Pt failed refill protocol for the following reasons:    Diabetic Medication Protocol [id number]/13/2020 08:56 AM   Last HgBA1C < 7.5 Protocol Details    Microalbumin procedure in past 12 months or taking ACE/ARB        Both meds failed for the above reason.

## 2020-12-15 DIAGNOSIS — E11.65 TYPE 2 DIABETES MELLITUS WITH HYPERGLYCEMIA, WITHOUT LONG-TERM CURRENT USE OF INSULIN (HCC): ICD-10-CM

## 2020-12-15 RX ORDER — METFORMIN HYDROCHLORIDE 500 MG/1
TABLET, EXTENDED RELEASE ORAL
Qty: 360 TABLET | Refills: 0 | Status: SHIPPED | OUTPATIENT
Start: 2020-12-15 | End: 2021-03-19

## 2020-12-15 RX ORDER — METFORMIN HYDROCHLORIDE 500 MG/1
1000 TABLET, EXTENDED RELEASE ORAL 2 TIMES DAILY WITH MEALS
Qty: 360 TABLET | Refills: 0 | OUTPATIENT
Start: 2020-12-15

## 2020-12-15 RX ORDER — GLIPIZIDE 5 MG/1
5 TABLET, FILM COATED, EXTENDED RELEASE ORAL DAILY
Qty: 90 TABLET | Refills: 0 | OUTPATIENT
Start: 2020-12-15

## 2020-12-15 RX ORDER — GLIPIZIDE 5 MG/1
TABLET, FILM COATED, EXTENDED RELEASE ORAL
Qty: 90 TABLET | Refills: 0 | Status: SHIPPED | OUTPATIENT
Start: 2020-12-15 | End: 2021-03-19

## 2020-12-15 NOTE — TELEPHONE ENCOUNTER
Future Appointments   Date Time Provider Laurel Buitrago   12/31/2020 11:45 AM Alis Nuñez DO [facility] MONIQUE Polanco      has appt scheduled  Can meds be refilled

## 2020-12-15 NOTE — TELEPHONE ENCOUNTER
Patient notified via detailed voicemail left at cell number (ok per  HIPAA consent)  Asked to call office back to schedule so med can be refilled

## 2020-12-31 ENCOUNTER — OFFICE VISIT (OUTPATIENT)
Dept: FAMILY MEDICINE CLINIC | Facility: CLINIC | Age: 50
End: 2020-12-31
Payer: COMMERCIAL

## 2020-12-31 VITALS
RESPIRATION RATE: 16 BRPM | HEIGHT: 70 IN | TEMPERATURE: 98 F | BODY MASS INDEX: 30.35 KG/M2 | WEIGHT: 212 LBS | OXYGEN SATURATION: 99 % | HEART RATE: 63 BPM | DIASTOLIC BLOOD PRESSURE: 80 MMHG | SYSTOLIC BLOOD PRESSURE: 120 MMHG

## 2020-12-31 DIAGNOSIS — R06.83 SNORING: Primary | ICD-10-CM

## 2020-12-31 DIAGNOSIS — E11.65 TYPE 2 DIABETES MELLITUS WITH HYPERGLYCEMIA, WITHOUT LONG-TERM CURRENT USE OF INSULIN (HCC): ICD-10-CM

## 2020-12-31 DIAGNOSIS — R74.8 ELEVATED LIVER ENZYMES: ICD-10-CM

## 2020-12-31 PROCEDURE — 3079F DIAST BP 80-89 MM HG: CPT | Performed by: FAMILY MEDICINE

## 2020-12-31 PROCEDURE — 80076 HEPATIC FUNCTION PANEL: CPT | Performed by: FAMILY MEDICINE

## 2020-12-31 PROCEDURE — 3008F BODY MASS INDEX DOCD: CPT | Performed by: FAMILY MEDICINE

## 2020-12-31 PROCEDURE — 80074 ACUTE HEPATITIS PANEL: CPT | Performed by: FAMILY MEDICINE

## 2020-12-31 PROCEDURE — 3074F SYST BP LT 130 MM HG: CPT | Performed by: FAMILY MEDICINE

## 2020-12-31 PROCEDURE — 83036 HEMOGLOBIN GLYCOSYLATED A1C: CPT | Performed by: FAMILY MEDICINE

## 2020-12-31 PROCEDURE — 99214 OFFICE O/P EST MOD 30 MIN: CPT | Performed by: FAMILY MEDICINE

## 2020-12-31 PROCEDURE — 36415 COLL VENOUS BLD VENIPUNCTURE: CPT | Performed by: FAMILY MEDICINE

## 2020-12-31 NOTE — PROGRESS NOTES
HPI:   Pierre Colin is a 44year old male who presents for recheck of his diabetes. Patient’s FBS have been as below. Last visit with ophthalmologist was . Pt has been checking his feet on a regular basis. Pt denies any tingling of the feet.  Pt denies an Microalbuminuria   >300 ug/mg creatinine      Albuminuria           Current Outpatient Medications   Medication Sig Dispense Refill   • METFORMIN HCL  MG Oral Tablet 24 Hr TAKE 2 TABLETS BY MOUTH TWICE DAILY WITH MEALS 360 tablet 0   • GLIPIZIDE ER 5 clear to auscultation  CARDIO: RRR without murmur  GI: good BS's,no masses, HSM or tenderness  EXTREMITIES: no cyanosis, clubbing or edema  Psych: normal affect     ASSESSMENT AND PLAN:   Bruce Joiner is a 44year old male who presents for a recheck of hi

## 2021-01-02 ENCOUNTER — TELEPHONE (OUTPATIENT)
Dept: FAMILY MEDICINE CLINIC | Facility: CLINIC | Age: 51
End: 2021-01-02

## 2021-01-02 DIAGNOSIS — R74.8 ELEVATED LIVER ENZYMES: Primary | ICD-10-CM

## 2021-01-02 NOTE — TELEPHONE ENCOUNTER
Patient notified and verbalized understanding. Number to schedule US and appt with GI provided.   Advised to have US prior to GI appt

## 2021-01-29 ENCOUNTER — HOSPITAL ENCOUNTER (OUTPATIENT)
Dept: ULTRASOUND IMAGING | Age: 51
Discharge: HOME | End: 2021-01-29
Attending: FAMILY MEDICINE
Payer: COMMERCIAL

## 2021-01-29 DIAGNOSIS — R79.89 ELEVATED LFTS: Primary | ICD-10-CM

## 2021-01-29 DIAGNOSIS — R74.8 ELEVATED LIVER ENZYMES: ICD-10-CM

## 2021-01-29 PROCEDURE — 76700 US EXAM ABDOM COMPLETE: CPT | Performed by: FAMILY MEDICINE

## 2021-02-04 ENCOUNTER — OFFICE VISIT (OUTPATIENT)
Dept: SLEEP CENTER | Age: 51
End: 2021-02-04
Attending: FAMILY MEDICINE
Payer: COMMERCIAL

## 2021-02-04 DIAGNOSIS — R06.83 SNORING: ICD-10-CM

## 2021-02-04 PROCEDURE — 95806 SLEEP STUDY UNATT&RESP EFFT: CPT

## 2021-02-10 NOTE — PROCEDURES
[address] [address],[facility]       Accredited by the [facility] ([facility]ASM)    PATIENT'S NAME:        Stacie Aikenr  ATTENDING PHYSICIAN:   Berkley Marie DO  REFERRING PHYSICIAN:   Franko Shields.  Aubrie Schmitt 157 saturations less than 88% was 0 minutes. IMPRESSION:  Overall apnea-hypopnea index is 4.5 and in the supine position it is at 6.7, SaO2 cristin is 84%. DIAGNOSIS:  Snoring and mild positional obstructive sleep apnea. RECOMMENDATIONS:    1.    At the

## 2021-02-18 ENCOUNTER — MED REC SCAN ONLY (OUTPATIENT)
Dept: FAMILY MEDICINE CLINIC | Facility: CLINIC | Age: 51
End: 2021-02-18

## 2021-03-19 DIAGNOSIS — E11.65 TYPE 2 DIABETES MELLITUS WITH HYPERGLYCEMIA, WITHOUT LONG-TERM CURRENT USE OF INSULIN (HCC): ICD-10-CM

## 2021-03-19 RX ORDER — METFORMIN HYDROCHLORIDE 500 MG/1
1000 TABLET, EXTENDED RELEASE ORAL 2 TIMES DAILY WITH MEALS
Qty: 360 TABLET | Refills: 0 | Status: SHIPPED | OUTPATIENT
Start: 2021-03-19 | End: 2021-07-06

## 2021-03-19 RX ORDER — GLIPIZIDE 5 MG/1
5 TABLET, FILM COATED, EXTENDED RELEASE ORAL DAILY
Qty: 90 TABLET | Refills: 0 | Status: SHIPPED | OUTPATIENT
Start: 2021-03-19 | End: 2021-07-06

## 2021-03-19 NOTE — TELEPHONE ENCOUNTER
Pt failed refill protocol for the following reasons:    Diabetic Medication Protocol [id number]/19/2021 09:25 AM   Microalbumin procedure in past 12 months or taking ACE/ARB     BOTH MEDS FAILED FOR THE SAME REASON    Glipizide:  Last refill: 12/15/2020 #90

## 2021-04-25 DIAGNOSIS — E78.00 PURE HYPERCHOLESTEROLEMIA: ICD-10-CM

## 2021-04-25 RX ORDER — ATORVASTATIN CALCIUM 10 MG/1
10 TABLET, FILM COATED ORAL DAILY
Qty: 90 TABLET | Refills: 2 | Status: CANCELLED | OUTPATIENT
Start: 2021-04-25

## 2021-05-24 DIAGNOSIS — E11.65 TYPE 2 DIABETES MELLITUS WITH HYPERGLYCEMIA, WITHOUT LONG-TERM CURRENT USE OF INSULIN (HCC): ICD-10-CM

## 2021-05-24 RX ORDER — BLOOD SUGAR DIAGNOSTIC
1 STRIP MISCELLANEOUS
Qty: 800 EACH | Refills: 3 | Status: SHIPPED | OUTPATIENT
Start: 2021-05-24

## 2021-07-06 DIAGNOSIS — E78.00 PURE HYPERCHOLESTEROLEMIA: ICD-10-CM

## 2021-07-06 DIAGNOSIS — E11.65 TYPE 2 DIABETES MELLITUS WITH HYPERGLYCEMIA, WITHOUT LONG-TERM CURRENT USE OF INSULIN (HCC): ICD-10-CM

## 2021-07-06 NOTE — TELEPHONE ENCOUNTER
Metformin #360 & Glipizide #90 last refilled 3/19/21 both with 0 RF  Atorvastatin last refilled 11/9/20 for #90 with 2 RF  LOV with KE 12/31/20  No future appt  Last ALT on 9/8/20 with a value of 121  Last A1C 12/31/20  Last Micro 11/25/19  Routed to PCP t

## 2021-07-08 RX ORDER — ATORVASTATIN CALCIUM 10 MG/1
10 TABLET, FILM COATED ORAL DAILY
Qty: 90 TABLET | Refills: 2 | Status: SHIPPED | OUTPATIENT
Start: 2021-07-08

## 2021-07-08 RX ORDER — GLIPIZIDE 5 MG/1
5 TABLET, FILM COATED, EXTENDED RELEASE ORAL DAILY
Qty: 90 TABLET | Refills: 0 | Status: SHIPPED | OUTPATIENT
Start: 2021-07-08 | End: 2021-09-19

## 2021-07-08 RX ORDER — METFORMIN HYDROCHLORIDE 500 MG/1
1000 TABLET, EXTENDED RELEASE ORAL 2 TIMES DAILY WITH MEALS
Qty: 360 TABLET | Refills: 0 | Status: SHIPPED | OUTPATIENT
Start: 2021-07-08 | End: 2021-09-19

## 2021-07-08 NOTE — TELEPHONE ENCOUNTER
Future Appointments   Date Time Provider Laurel Buitrago   7/26/2021  9:30 AM DO MINERVA Quinonez [facility] Brittani Jackson        Routed to  to advise if should be #30 or #90

## 2021-07-09 ENCOUNTER — OFFICE VISIT (OUTPATIENT)
Dept: FAMILY MEDICINE CLINIC | Facility: CLINIC | Age: 51
End: 2021-07-09
Payer: COMMERCIAL

## 2021-07-09 VITALS
RESPIRATION RATE: 14 BRPM | BODY MASS INDEX: 29.82 KG/M2 | OXYGEN SATURATION: 97 % | HEART RATE: 76 BPM | SYSTOLIC BLOOD PRESSURE: 104 MMHG | DIASTOLIC BLOOD PRESSURE: 60 MMHG | TEMPERATURE: 98 F | HEIGHT: 71 IN | WEIGHT: 213 LBS

## 2021-07-09 DIAGNOSIS — E11.65 TYPE 2 DIABETES MELLITUS WITH HYPERGLYCEMIA, WITHOUT LONG-TERM CURRENT USE OF INSULIN (HCC): Primary | ICD-10-CM

## 2021-07-09 DIAGNOSIS — Z12.5 SCREENING FOR MALIGNANT NEOPLASM OF PROSTATE: ICD-10-CM

## 2021-07-09 LAB
ALBUMIN SERPL-MCNC: 4.2 G/DL (ref 3.4–5)
ALBUMIN/GLOB SERPL: 1.4 {RATIO} (ref 1–2)
ALP LIVER SERPL-CCNC: 89 U/L
ALT SERPL-CCNC: 89 U/L
ANION GAP SERPL CALC-SCNC: 4 MMOL/L (ref 0–18)
AST SERPL-CCNC: 42 U/L (ref 15–37)
BILIRUB SERPL-MCNC: 1 MG/DL (ref 0.1–2)
BUN BLD-MCNC: 20 MG/DL (ref 7–18)
BUN/CREAT SERPL: 18 (ref 10–20)
CALCIUM BLD-MCNC: 9.1 MG/DL (ref 8.5–10.1)
CHLORIDE SERPL-SCNC: 111 MMOL/L (ref 98–112)
CHOLEST SMN-MCNC: 172 MG/DL (ref ?–200)
CO2 SERPL-SCNC: 26 MMOL/L (ref 21–32)
COMPLEXED PSA SERPL-MCNC: 0.54 NG/ML (ref ?–4)
CREAT BLD-MCNC: 1.11 MG/DL
CREAT UR-SCNC: 118 MG/DL
EST. AVERAGE GLUCOSE BLD GHB EST-MCNC: 160 MG/DL (ref 68–126)
GLOBULIN PLAS-MCNC: 3 G/DL (ref 2.8–4.4)
GLUCOSE BLD-MCNC: 133 MG/DL (ref 70–99)
HBA1C MFR BLD HPLC: 7.2 % (ref ?–5.7)
HDLC SERPL-MCNC: 50 MG/DL (ref 40–59)
LDLC SERPL CALC-MCNC: 94 MG/DL (ref ?–100)
M PROTEIN MFR SERPL ELPH: 7.2 G/DL (ref 6.4–8.2)
MICROALBUMIN UR-MCNC: 2.08 MG/DL
MICROALBUMIN/CREAT 24H UR-RTO: 17.6 UG/MG (ref ?–30)
NONHDLC SERPL-MCNC: 122 MG/DL (ref ?–130)
OSMOLALITY SERPL CALC.SUM OF ELEC: 297 MOSM/KG (ref 275–295)
PATIENT FASTING Y/N/NP: NO
PATIENT FASTING Y/N/NP: NO
POTASSIUM SERPL-SCNC: 4.5 MMOL/L (ref 3.5–5.1)
SODIUM SERPL-SCNC: 141 MMOL/L (ref 136–145)
TRIGL SERPL-MCNC: 164 MG/DL (ref 30–149)
VLDLC SERPL CALC-MCNC: 27 MG/DL (ref 0–30)

## 2021-07-09 PROCEDURE — 82043 UR ALBUMIN QUANTITATIVE: CPT | Performed by: FAMILY MEDICINE

## 2021-07-09 PROCEDURE — 80053 COMPREHEN METABOLIC PANEL: CPT | Performed by: FAMILY MEDICINE

## 2021-07-09 PROCEDURE — 3078F DIAST BP <80 MM HG: CPT | Performed by: FAMILY MEDICINE

## 2021-07-09 PROCEDURE — 3008F BODY MASS INDEX DOCD: CPT | Performed by: FAMILY MEDICINE

## 2021-07-09 PROCEDURE — 80061 LIPID PANEL: CPT | Performed by: FAMILY MEDICINE

## 2021-07-09 PROCEDURE — 99214 OFFICE O/P EST MOD 30 MIN: CPT | Performed by: FAMILY MEDICINE

## 2021-07-09 PROCEDURE — 83036 HEMOGLOBIN GLYCOSYLATED A1C: CPT | Performed by: FAMILY MEDICINE

## 2021-07-09 PROCEDURE — 3074F SYST BP LT 130 MM HG: CPT | Performed by: FAMILY MEDICINE

## 2021-07-09 PROCEDURE — 82570 ASSAY OF URINE CREATININE: CPT | Performed by: FAMILY MEDICINE

## 2021-07-09 PROCEDURE — 84153 ASSAY OF PSA TOTAL: CPT | Performed by: FAMILY MEDICINE

## 2021-07-09 NOTE — PROGRESS NOTES
HPI:   Joni Perez is a 44year old male who presents for recheck of his diabetes. Patient’s FBS have been 90's. Last visit with ophthalmologist was over a year ago. Pt has been checking his feet on a regular basis.  Pt denies any tingling of the fe metFORMIN HCl  MG Oral Tablet 24 Hr Take 2 tablets (1,000 mg total) by mouth 2 (two) times daily with meals. 360 tablet 0   • glipiZIDE ER 5 MG Oral Tablet 24 Hr Take 1 tablet (5 mg total) by mouth daily.  90 tablet 0   • CONTOUR NEXT TEST In [facility] diabetic exam is normal, visualized feet and the appearance is normal.  Bilateral monofilament/sensation of both feet is normal.  Pulsation pedal pulse exam of both lower legs/feet is normal as well.       ASSESSMENT AND PLAN:   Harsh Lakhani is a 44 y

## 2021-09-19 DIAGNOSIS — E11.65 TYPE 2 DIABETES MELLITUS WITH HYPERGLYCEMIA, WITHOUT LONG-TERM CURRENT USE OF INSULIN (HCC): ICD-10-CM

## 2021-09-20 RX ORDER — GLIPIZIDE 5 MG/1
5 TABLET, FILM COATED, EXTENDED RELEASE ORAL DAILY
Qty: 90 TABLET | Refills: 1 | Status: SHIPPED | OUTPATIENT
Start: 2021-09-20 | End: 2022-03-23

## 2021-09-20 RX ORDER — METFORMIN HYDROCHLORIDE 500 MG/1
1000 TABLET, EXTENDED RELEASE ORAL 2 TIMES DAILY WITH MEALS
Qty: 360 TABLET | Refills: 1 | Status: SHIPPED | OUTPATIENT
Start: 2021-09-20 | End: 2022-03-23

## 2021-09-20 NOTE — TELEPHONE ENCOUNTER
Diabetic Medication Protocol Passed 09/19/2021 07:03 PM   Protocol Details  HgBA1C procedure resulted in past 6 months    Last HgBA1C < 7.5    Microalbumin procedure in past 12 months or taking ACE/ARB    Appointment in past 6 or next 3 months     Last refilled on 07/08/2021 for # 360 with 0 rf. Last labs 07/09/2021. Last seen on 07/09/2021. Thank you.

## 2022-01-24 ENCOUNTER — TELEPHONE (OUTPATIENT)
Dept: FAMILY MEDICINE CLINIC | Facility: CLINIC | Age: 52
End: 2022-01-24

## 2022-03-24 RX ORDER — METFORMIN HYDROCHLORIDE 500 MG/1
1000 TABLET, EXTENDED RELEASE ORAL 2 TIMES DAILY WITH MEALS
Qty: 360 TABLET | Refills: 1 | Status: SHIPPED | OUTPATIENT
Start: 2022-03-24

## 2022-03-24 RX ORDER — ATORVASTATIN CALCIUM 10 MG/1
10 TABLET, FILM COATED ORAL DAILY
Qty: 90 TABLET | Refills: 2 | Status: SHIPPED | OUTPATIENT
Start: 2022-03-24

## 2022-03-24 RX ORDER — GLIPIZIDE 5 MG/1
5 TABLET, FILM COATED, EXTENDED RELEASE ORAL DAILY
Qty: 90 TABLET | Refills: 1 | Status: SHIPPED | OUTPATIENT
Start: 2022-03-24

## 2022-03-24 NOTE — TELEPHONE ENCOUNTER
Cholesterol Medication Protocol Failed 03/23/2022 03:50 PM   Protocol Details  ALT < 80    ALT resulted within past year    Lipid panel within past 12 months    Appointment within past 12 or next 3 months        Diabetic Medication Protocol Failed 03/23/2022 03:50 PM   Protocol Details  HgBA1C procedure resulted in past 6 months    Last HgBA1C < 7.5    Microalbumin procedure in past 12 months or taking ACE/ARB    Appointment in past 6 or next 3 months        Last OV 7/9/21  Last lab 7/9/21 Lipid, CMP/ALT 89, A1c 7.2  Last refilled:  9/20/21 metformin #360  1 refill  9/20/21 glipizide #90  1 refill  7/8/21 Atorvastatin  #90  2 refills    Future Appointments   Date Time Provider Laurel Buitrago   4/29/2022  9:30 AM Tia Purcell [facility] MONIQUE Ordaz

## 2022-04-29 ENCOUNTER — OFFICE VISIT (OUTPATIENT)
Dept: FAMILY MEDICINE CLINIC | Facility: CLINIC | Age: 52
End: 2022-04-29
Payer: COMMERCIAL

## 2022-04-29 VITALS
HEART RATE: 65 BPM | BODY MASS INDEX: 29.96 KG/M2 | DIASTOLIC BLOOD PRESSURE: 82 MMHG | RESPIRATION RATE: 16 BRPM | SYSTOLIC BLOOD PRESSURE: 120 MMHG | HEIGHT: 71 IN | OXYGEN SATURATION: 99 % | TEMPERATURE: 98 F | WEIGHT: 214 LBS

## 2022-04-29 DIAGNOSIS — E11.65 TYPE 2 DIABETES MELLITUS WITH HYPERGLYCEMIA, WITHOUT LONG-TERM CURRENT USE OF INSULIN (HCC): Primary | ICD-10-CM

## 2022-04-29 LAB
EST. AVERAGE GLUCOSE BLD GHB EST-MCNC: 171 MG/DL (ref 68–126)
HBA1C MFR BLD: 7.6 % (ref ?–5.7)

## 2022-04-29 PROCEDURE — 3079F DIAST BP 80-89 MM HG: CPT | Performed by: FAMILY MEDICINE

## 2022-04-29 PROCEDURE — 3051F HG A1C>EQUAL 7.0%<8.0%: CPT | Performed by: FAMILY MEDICINE

## 2022-04-29 PROCEDURE — 3074F SYST BP LT 130 MM HG: CPT | Performed by: FAMILY MEDICINE

## 2022-04-29 PROCEDURE — 83036 HEMOGLOBIN GLYCOSYLATED A1C: CPT | Performed by: FAMILY MEDICINE

## 2022-04-29 PROCEDURE — 99214 OFFICE O/P EST MOD 30 MIN: CPT | Performed by: FAMILY MEDICINE

## 2022-04-29 PROCEDURE — 3008F BODY MASS INDEX DOCD: CPT | Performed by: FAMILY MEDICINE

## 2022-04-30 ENCOUNTER — TELEPHONE (OUTPATIENT)
Dept: FAMILY MEDICINE CLINIC | Facility: CLINIC | Age: 52
End: 2022-04-30

## 2022-04-30 NOTE — TELEPHONE ENCOUNTER
----- Message from Eliud Sharma DO sent at 4/30/2022  9:50 AM CDT -----  Recall A1c in 3 months. Melanie sent: [facility], your A1c is a little higher, but not about 8. Lets leave things the same, work on eating less carbs and sugars and getting more exercise.  See me again in 3-6 months. - Dr. Shona Johnson

## 2022-04-30 NOTE — TELEPHONE ENCOUNTER
Called for a eye exam. I left a voicemail for medical records with patients information and our fax number.

## 2022-05-03 ENCOUNTER — TELEPHONE (OUTPATIENT)
Dept: FAMILY MEDICINE CLINIC | Facility: CLINIC | Age: 52
End: 2022-05-03

## 2022-05-03 NOTE — TELEPHONE ENCOUNTER
Exam note received from [facility]  Exam date 10/22/21  No retinopathy noted  flowsheet updated  Report to scanning

## 2022-11-14 DIAGNOSIS — E78.00 PURE HYPERCHOLESTEROLEMIA: ICD-10-CM

## 2022-11-14 DIAGNOSIS — E11.65 TYPE 2 DIABETES MELLITUS WITH HYPERGLYCEMIA, WITHOUT LONG-TERM CURRENT USE OF INSULIN (HCC): ICD-10-CM

## 2022-11-15 NOTE — TELEPHONE ENCOUNTER
Pt failed refill protocol for the following reasons:   Cholesterol Medication Protocol Failed 11/14/2022 09:49 PM   Protocol Details  ALT < 80    ALT resulted within past year    Lipid panel within past 12 months      Atorvastatin:  Last refill: 3/24/2022 #90 with 2 refill     Diabetic Medication Protocol Failed 11/14/2022 09:49 PM   Protocol Details  HgBA1C procedure resulted in past 6 months    Last HgBA1C < 7.5    Microalbumin procedure in past 12 months or taking ACE/ARB    Appointment in past 6 or next 3 months        Glipizide:  Last refill: 3/24/2022 #90 with 1 refill    Metformin:  Last refill: 3/24/2022 #360 with 1 refill    Last appt: 4/29/2022  Next appt: No future appointments. Forward to Dr. Shazia Bush, please advise on refills. Thank you.

## 2022-11-18 RX ORDER — METFORMIN HYDROCHLORIDE 500 MG/1
1000 TABLET, EXTENDED RELEASE ORAL 2 TIMES DAILY WITH MEALS
Qty: 360 TABLET | Refills: 0 | Status: SHIPPED | OUTPATIENT
Start: 2022-11-18

## 2022-11-18 RX ORDER — ATORVASTATIN CALCIUM 10 MG/1
10 TABLET, FILM COATED ORAL DAILY
Qty: 90 TABLET | Refills: 0 | Status: SHIPPED | OUTPATIENT
Start: 2022-11-18

## 2022-11-18 RX ORDER — GLIPIZIDE 5 MG/1
5 TABLET, FILM COATED, EXTENDED RELEASE ORAL DAILY
Qty: 90 TABLET | Refills: 0 | Status: SHIPPED | OUTPATIENT
Start: 2022-11-18

## 2022-12-05 ENCOUNTER — OFFICE VISIT (OUTPATIENT)
Dept: FAMILY MEDICINE CLINIC | Facility: CLINIC | Age: 52
End: 2022-12-05
Payer: COMMERCIAL

## 2022-12-05 VITALS
TEMPERATURE: 98 F | RESPIRATION RATE: 16 BRPM | WEIGHT: 206 LBS | HEART RATE: 60 BPM | BODY MASS INDEX: 28.84 KG/M2 | SYSTOLIC BLOOD PRESSURE: 120 MMHG | DIASTOLIC BLOOD PRESSURE: 70 MMHG | HEIGHT: 71 IN | OXYGEN SATURATION: 98 %

## 2022-12-05 DIAGNOSIS — E11.65 TYPE 2 DIABETES MELLITUS WITH HYPERGLYCEMIA, WITHOUT LONG-TERM CURRENT USE OF INSULIN (HCC): Primary | ICD-10-CM

## 2022-12-05 DIAGNOSIS — Z23 NEED FOR VACCINATION: ICD-10-CM

## 2022-12-05 DIAGNOSIS — Z12.5 SCREENING FOR MALIGNANT NEOPLASM OF PROSTATE: ICD-10-CM

## 2022-12-05 DIAGNOSIS — E78.00 PURE HYPERCHOLESTEROLEMIA: ICD-10-CM

## 2022-12-05 DIAGNOSIS — R74.8 ELEVATED LIVER ENZYMES: ICD-10-CM

## 2022-12-05 LAB
ALBUMIN SERPL-MCNC: 4.4 G/DL (ref 3.4–5)
ALBUMIN/GLOB SERPL: 1.4 {RATIO} (ref 1–2)
ALP LIVER SERPL-CCNC: 93 U/L
ALT SERPL-CCNC: 63 U/L
ANION GAP SERPL CALC-SCNC: 5 MMOL/L (ref 0–18)
AST SERPL-CCNC: 25 U/L (ref 15–37)
BASOPHILS # BLD AUTO: 0.05 X10(3) UL (ref 0–0.2)
BASOPHILS NFR BLD AUTO: 0.7 %
BILIRUB SERPL-MCNC: 0.8 MG/DL (ref 0.1–2)
BUN BLD-MCNC: 14 MG/DL (ref 7–18)
CALCIUM BLD-MCNC: 9.9 MG/DL (ref 8.5–10.1)
CHLORIDE SERPL-SCNC: 106 MMOL/L (ref 98–112)
CHOLEST SERPL-MCNC: 183 MG/DL (ref ?–200)
CO2 SERPL-SCNC: 29 MMOL/L (ref 21–32)
COMPLEXED PSA SERPL-MCNC: 0.74 NG/ML (ref ?–4)
CREAT BLD-MCNC: 0.92 MG/DL
CREAT UR-SCNC: 141 MG/DL
EOSINOPHIL # BLD AUTO: 0.1 X10(3) UL (ref 0–0.7)
EOSINOPHIL NFR BLD AUTO: 1.5 %
ERYTHROCYTE [DISTWIDTH] IN BLOOD BY AUTOMATED COUNT: 12.4 %
EST. AVERAGE GLUCOSE BLD GHB EST-MCNC: 157 MG/DL (ref 68–126)
FASTING PATIENT LIPID ANSWER: YES
FASTING STATUS PATIENT QL REPORTED: YES
GFR SERPLBLD BASED ON 1.73 SQ M-ARVRAT: 99 ML/MIN/1.73M2 (ref 60–?)
GLOBULIN PLAS-MCNC: 3.1 G/DL (ref 2.8–4.4)
GLUCOSE BLD-MCNC: 113 MG/DL (ref 70–99)
HBA1C MFR BLD: 7.1 % (ref ?–5.7)
HCT VFR BLD AUTO: 43 %
HDLC SERPL-MCNC: 69 MG/DL (ref 40–59)
HGB BLD-MCNC: 14.7 G/DL
IMM GRANULOCYTES # BLD AUTO: 0.02 X10(3) UL (ref 0–1)
IMM GRANULOCYTES NFR BLD: 0.3 %
LDLC SERPL CALC-MCNC: 89 MG/DL (ref ?–100)
LYMPHOCYTES # BLD AUTO: 2.34 X10(3) UL (ref 1–4)
LYMPHOCYTES NFR BLD AUTO: 34.1 %
MCH RBC QN AUTO: 32.1 PG (ref 26–34)
MCHC RBC AUTO-ENTMCNC: 34.2 G/DL (ref 31–37)
MCV RBC AUTO: 93.9 FL
MICROALBUMIN UR-MCNC: 1.76 MG/DL
MICROALBUMIN/CREAT 24H UR-RTO: 12.5 UG/MG (ref ?–30)
MONOCYTES # BLD AUTO: 0.59 X10(3) UL (ref 0.1–1)
MONOCYTES NFR BLD AUTO: 8.6 %
NEUTROPHILS # BLD AUTO: 3.76 X10 (3) UL (ref 1.5–7.7)
NEUTROPHILS # BLD AUTO: 3.76 X10(3) UL (ref 1.5–7.7)
NEUTROPHILS NFR BLD AUTO: 54.8 %
NONHDLC SERPL-MCNC: 114 MG/DL (ref ?–130)
OSMOLALITY SERPL CALC.SUM OF ELEC: 291 MOSM/KG (ref 275–295)
PLATELET # BLD AUTO: 192 10(3)UL (ref 150–450)
POTASSIUM SERPL-SCNC: 4.3 MMOL/L (ref 3.5–5.1)
PROT SERPL-MCNC: 7.5 G/DL (ref 6.4–8.2)
RBC # BLD AUTO: 4.58 X10(6)UL
SODIUM SERPL-SCNC: 140 MMOL/L (ref 136–145)
TRIGL SERPL-MCNC: 145 MG/DL (ref 30–149)
VLDLC SERPL CALC-MCNC: 23 MG/DL (ref 0–30)
WBC # BLD AUTO: 6.9 X10(3) UL (ref 4–11)

## 2022-12-05 PROCEDURE — 83036 HEMOGLOBIN GLYCOSYLATED A1C: CPT | Performed by: FAMILY MEDICINE

## 2022-12-05 PROCEDURE — 90471 IMMUNIZATION ADMIN: CPT | Performed by: FAMILY MEDICINE

## 2022-12-05 PROCEDURE — 3074F SYST BP LT 130 MM HG: CPT | Performed by: FAMILY MEDICINE

## 2022-12-05 PROCEDURE — 90686 IIV4 VACC NO PRSV 0.5 ML IM: CPT | Performed by: FAMILY MEDICINE

## 2022-12-05 PROCEDURE — 3008F BODY MASS INDEX DOCD: CPT | Performed by: FAMILY MEDICINE

## 2022-12-05 PROCEDURE — 80050 GENERAL HEALTH PANEL: CPT | Performed by: FAMILY MEDICINE

## 2022-12-05 PROCEDURE — 99214 OFFICE O/P EST MOD 30 MIN: CPT | Performed by: FAMILY MEDICINE

## 2022-12-05 PROCEDURE — 82570 ASSAY OF URINE CREATININE: CPT | Performed by: FAMILY MEDICINE

## 2022-12-05 PROCEDURE — 82043 UR ALBUMIN QUANTITATIVE: CPT | Performed by: FAMILY MEDICINE

## 2022-12-05 PROCEDURE — 3078F DIAST BP <80 MM HG: CPT | Performed by: FAMILY MEDICINE

## 2022-12-05 PROCEDURE — 90472 IMMUNIZATION ADMIN EACH ADD: CPT | Performed by: FAMILY MEDICINE

## 2022-12-05 PROCEDURE — 90677 PCV20 VACCINE IM: CPT | Performed by: FAMILY MEDICINE

## 2022-12-05 PROCEDURE — 80061 LIPID PANEL: CPT | Performed by: FAMILY MEDICINE

## 2023-02-24 DIAGNOSIS — E78.00 PURE HYPERCHOLESTEROLEMIA: ICD-10-CM

## 2023-02-24 DIAGNOSIS — E11.65 TYPE 2 DIABETES MELLITUS WITH HYPERGLYCEMIA, WITHOUT LONG-TERM CURRENT USE OF INSULIN (HCC): ICD-10-CM

## 2023-02-24 RX ORDER — ATORVASTATIN CALCIUM 10 MG/1
TABLET, FILM COATED ORAL
Qty: 90 TABLET | Refills: 0 | Status: SHIPPED | OUTPATIENT
Start: 2023-02-24

## 2023-02-24 RX ORDER — GLIPIZIDE 5 MG/1
TABLET, FILM COATED, EXTENDED RELEASE ORAL
Qty: 90 TABLET | Refills: 0 | Status: SHIPPED | OUTPATIENT
Start: 2023-02-24

## 2023-02-24 RX ORDER — METFORMIN HYDROCHLORIDE 500 MG/1
TABLET, EXTENDED RELEASE ORAL
Qty: 360 TABLET | Refills: 0 | Status: SHIPPED | OUTPATIENT
Start: 2023-02-24

## 2023-02-24 NOTE — TELEPHONE ENCOUNTER
Diabetic Medication Protocol Passed 02/24/2023 11:49 AM   Protocol Details  HgBA1C procedure resulted in past 6 months    Last HgBA1C < 7.5    Microalbumin procedure in past 12 months or taking ACE/ARB    Appointment in past 6 or next 3 months     Last OV 12/5/22  Last lab 12/5/22 A1c 7.1, microalbumin, lipid, CMP/alt 63  Last refilled:  11/18/22 atorvastatin #90  0 refills  11/18/22 metformin #360  0 refill  11/18/22 glipizide #90  0 refills     Refilled per protocol

## 2023-04-04 ENCOUNTER — TELEPHONE (OUTPATIENT)
Dept: FAMILY MEDICINE CLINIC | Facility: CLINIC | Age: 53
End: 2023-04-04

## 2023-04-04 NOTE — TELEPHONE ENCOUNTER
Exam note received from [facility]  Exam date 4/1/23  No retinopathy noted  flowsheet updated  Report to scanning

## 2023-06-01 ENCOUNTER — TELEPHONE (OUTPATIENT)
Dept: FAMILY MEDICINE CLINIC | Facility: CLINIC | Age: 53
End: 2023-06-01

## 2023-06-01 NOTE — TELEPHONE ENCOUNTER
Per KE, pull tab is not sharp, should pass without difficulty. If any coughing or feels like it is stuck needs to be seen. Patient notified and verbalized understanding. States he swallowed it easily, no coughing or feeling stuck. Also advised to let office know or go to UC if starts having abd pain later.

## 2023-06-14 DIAGNOSIS — E11.65 TYPE 2 DIABETES MELLITUS WITH HYPERGLYCEMIA, WITHOUT LONG-TERM CURRENT USE OF INSULIN (HCC): ICD-10-CM

## 2023-06-14 DIAGNOSIS — E78.00 PURE HYPERCHOLESTEROLEMIA: ICD-10-CM

## 2023-06-15 NOTE — TELEPHONE ENCOUNTER
Last OV 12/5/22  Last lab 12/5/22 Lipid, CMP/ALT 63, microal, A1c 7.1  3 meds last refilled 2/24/23  3 month 0 refills    Diabetic Medication Protocol Failed 06/14/2023 09:06 PM   Protocol Details  HgBA1C procedure resulted in past 6 months    Last HgBA1C < 7.5    Appointment in past 6 or next 3 months    Microalbumin procedure in past 12 months or taking ACE/ARB        Cholesterol Medication Protocol Passed 06/14/2023 09:06 PM   Protocol Details  ALT < 80    ALT resulted within past year    Lipid panel within past 12 months    Appointment within past 12 or next 3 months

## 2023-06-19 RX ORDER — GLIPIZIDE 5 MG/1
5 TABLET, FILM COATED, EXTENDED RELEASE ORAL DAILY
Qty: 90 TABLET | Refills: 0 | Status: SHIPPED | OUTPATIENT
Start: 2023-06-19

## 2023-06-19 RX ORDER — ATORVASTATIN CALCIUM 10 MG/1
10 TABLET, FILM COATED ORAL DAILY
Qty: 90 TABLET | Refills: 0 | Status: SHIPPED | OUTPATIENT
Start: 2023-06-19

## 2023-06-19 RX ORDER — METFORMIN HYDROCHLORIDE 500 MG/1
1000 TABLET, EXTENDED RELEASE ORAL 2 TIMES DAILY WITH MEALS
Qty: 360 TABLET | Refills: 0 | Status: SHIPPED | OUTPATIENT
Start: 2023-06-19

## 2023-06-23 ENCOUNTER — OFFICE VISIT (OUTPATIENT)
Dept: FAMILY MEDICINE CLINIC | Facility: CLINIC | Age: 53
End: 2023-06-23
Payer: COMMERCIAL

## 2023-06-23 VITALS
OXYGEN SATURATION: 98 % | HEART RATE: 67 BPM | RESPIRATION RATE: 18 BRPM | TEMPERATURE: 98 F | BODY MASS INDEX: 28 KG/M2 | SYSTOLIC BLOOD PRESSURE: 122 MMHG | WEIGHT: 202.25 LBS | DIASTOLIC BLOOD PRESSURE: 80 MMHG

## 2023-06-23 DIAGNOSIS — L98.9 SKIN LESION OF LEFT LEG: ICD-10-CM

## 2023-06-23 DIAGNOSIS — E11.65 TYPE 2 DIABETES MELLITUS WITH HYPERGLYCEMIA, WITHOUT LONG-TERM CURRENT USE OF INSULIN (HCC): Primary | ICD-10-CM

## 2023-06-23 DIAGNOSIS — E78.00 PURE HYPERCHOLESTEROLEMIA: ICD-10-CM

## 2023-06-23 DIAGNOSIS — F10.90 HEAVY ALCOHOL USE: ICD-10-CM

## 2023-06-23 DIAGNOSIS — R74.8 ELEVATED LIVER ENZYMES: ICD-10-CM

## 2023-06-23 DIAGNOSIS — J30.89 ENVIRONMENTAL AND SEASONAL ALLERGIES: ICD-10-CM

## 2023-06-23 LAB
ALBUMIN SERPL-MCNC: 4.4 G/DL (ref 3.4–5)
ALP LIVER SERPL-CCNC: 86 U/L
ALT SERPL-CCNC: 53 U/L
AST SERPL-CCNC: 33 U/L (ref 15–37)
BILIRUB DIRECT SERPL-MCNC: 0.3 MG/DL (ref 0–0.2)
BILIRUB SERPL-MCNC: 1.1 MG/DL (ref 0.1–2)
PROT SERPL-MCNC: 7.7 G/DL (ref 6.4–8.2)

## 2023-06-23 PROCEDURE — 80076 HEPATIC FUNCTION PANEL: CPT | Performed by: FAMILY MEDICINE

## 2023-06-23 PROCEDURE — 3074F SYST BP LT 130 MM HG: CPT | Performed by: FAMILY MEDICINE

## 2023-06-23 PROCEDURE — 99214 OFFICE O/P EST MOD 30 MIN: CPT | Performed by: FAMILY MEDICINE

## 2023-06-23 PROCEDURE — 3079F DIAST BP 80-89 MM HG: CPT | Performed by: FAMILY MEDICINE

## 2023-06-23 PROCEDURE — 83036 HEMOGLOBIN GLYCOSYLATED A1C: CPT | Performed by: FAMILY MEDICINE

## 2023-06-23 RX ORDER — GLIPIZIDE 5 MG/1
5 TABLET, FILM COATED, EXTENDED RELEASE ORAL DAILY
Qty: 90 TABLET | Refills: 0 | Status: SHIPPED | OUTPATIENT
Start: 2023-06-23

## 2023-06-23 RX ORDER — AMOXICILLIN 500 MG/1
TABLET, FILM COATED ORAL
COMMUNITY
Start: 2023-06-22

## 2023-06-23 RX ORDER — ATORVASTATIN CALCIUM 10 MG/1
10 TABLET, FILM COATED ORAL DAILY
Qty: 90 TABLET | Refills: 1 | Status: SHIPPED | OUTPATIENT
Start: 2023-06-23

## 2023-06-23 RX ORDER — CHLORHEXIDINE GLUCONATE 0.12 MG/ML
RINSE ORAL
COMMUNITY
Start: 2023-06-22

## 2023-06-23 RX ORDER — METFORMIN HYDROCHLORIDE 500 MG/1
1000 TABLET, EXTENDED RELEASE ORAL 2 TIMES DAILY WITH MEALS
Qty: 360 TABLET | Refills: 1 | Status: SHIPPED | OUTPATIENT
Start: 2023-06-23

## 2023-06-24 LAB
EST. AVERAGE GLUCOSE BLD GHB EST-MCNC: 143 MG/DL (ref 68–126)
HBA1C MFR BLD: 6.6 % (ref ?–5.7)

## 2024-07-08 ENCOUNTER — MED REC SCAN ONLY (OUTPATIENT)
Dept: FAMILY MEDICINE CLINIC | Facility: CLINIC | Age: 54
End: 2024-07-08

## 2025-02-10 ENCOUNTER — TELEPHONE (OUTPATIENT)
Dept: FAMILY MEDICINE CLINIC | Facility: CLINIC | Age: 55
End: 2025-02-10

## 2025-02-10 ENCOUNTER — MED REC SCAN ONLY (OUTPATIENT)
Dept: FAMILY MEDICINE CLINIC | Facility: CLINIC | Age: 55
End: 2025-02-10

## 2025-02-10 NOTE — TELEPHONE ENCOUNTER
Exam note received from [facility]  Exam date 2/10/25  No retinopathy noted  flowsheet updated  Report to scanning

## (undated) DIAGNOSIS — E78.00 PURE HYPERCHOLESTEROLEMIA: ICD-10-CM

## (undated) DIAGNOSIS — E11.65 TYPE 2 DIABETES MELLITUS WITH HYPERGLYCEMIA, WITHOUT LONG-TERM CURRENT USE OF INSULIN (HCC): ICD-10-CM

## (undated) NOTE — LETTER
12/13/19  [place] [id number]      Dear Jerel Lowe. To help us provide the highest quality medical care, [facility] uses a sophisticated computer system to track our patient records.  During a review

## (undated) NOTE — LETTER
[address] [place] [id number]    1/24/2022      Dear  [facility]    In order to provide the highest quality care, [facility] uses a sophisticated computer system to track our pat

## (undated) NOTE — Clinical Note
Pt had eye exam at [facility] in the past 6 months. Please call for report to be sent over.  - KLE

## (undated) NOTE — LETTER
04/01/19        [place] [id number]      Dear Hilda Uribe,    [id number] records indicate that you have outstanding lab work and or testing that was ordered for you and has not yet been completed:  Lab Frequency Next Occurrence   CO

## (undated) NOTE — Clinical Note
He is short of breath with exertion, not starting to get better yet and uncontrolled diabetic. Please follow up in 1-2 days for monitoring. Thank you!